# Patient Record
Sex: MALE | Race: BLACK OR AFRICAN AMERICAN | Employment: UNEMPLOYED | URBAN - METROPOLITAN AREA
[De-identification: names, ages, dates, MRNs, and addresses within clinical notes are randomized per-mention and may not be internally consistent; named-entity substitution may affect disease eponyms.]

---

## 2021-06-15 ENCOUNTER — HOSPITAL ENCOUNTER (EMERGENCY)
Age: 31
Discharge: HOME | End: 2021-06-16
Attending: EMERGENCY MEDICINE
Payer: MEDICAID

## 2021-06-15 VITALS
HEIGHT: 63 IN | HEART RATE: 98 BPM | RESPIRATION RATE: 20 BRPM | SYSTOLIC BLOOD PRESSURE: 146 MMHG | OXYGEN SATURATION: 97 % | TEMPERATURE: 98.1 F | WEIGHT: 135 LBS | BODY MASS INDEX: 23.92 KG/M2 | DIASTOLIC BLOOD PRESSURE: 79 MMHG

## 2021-06-15 DIAGNOSIS — Z72.0 TOBACCO ABUSE: ICD-10-CM

## 2021-06-15 DIAGNOSIS — J34.89 NASAL PAIN: ICD-10-CM

## 2021-06-15 DIAGNOSIS — S02.2XXA CLOSED FRACTURE OF NASAL BONE, INITIAL ENCOUNTER: Primary | ICD-10-CM
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
## 2021-06-15 PROCEDURE — 99284 EMERGENCY DEPT VISIT MOD MDM: CPT

## 2021-06-15 NOTE — LETTER
[facility] EMERGENCY DEPT 
[address] [address] 
[phone] Work/School Note Date: 6/15/2021 To Whom It May concern: 
 
Froy Ramirez was seen and treated today in the emergency room by the following provider(s): 
Attending Provider: Job Martinez MD.   
 
Froy Ramirez may return to work on 6/18/21. Sincerely, Duy Tavarez MD

## 2021-06-16 ENCOUNTER — APPOINTMENT (OUTPATIENT)
Dept: GENERAL RADIOLOGY | Age: 31
End: 2021-06-16
Attending: EMERGENCY MEDICINE
Payer: MEDICAID

## 2021-06-16 PROCEDURE — 74011250637 HC RX REV CODE- 250/637: Performed by: EMERGENCY MEDICINE

## 2021-06-16 PROCEDURE — 70160 X-RAY EXAM OF NASAL BONES: CPT

## 2021-06-16 RX ORDER — OXYMETAZOLINE HCL 0.05 %
2 SPRAY, NON-AEROSOL (ML) NASAL 2 TIMES DAILY
Qty: 1 EACH | Refills: 0 | Status: SHIPPED | OUTPATIENT
Start: 2021-06-16 | End: 2021-06-16 | Stop reason: SDUPTHER

## 2021-06-16 RX ORDER — BUTALBITAL, ACETAMINOPHEN AND CAFFEINE 50; 325; 40 MG/1; MG/1; MG/1
1 TABLET ORAL
Status: COMPLETED | OUTPATIENT
Start: 2021-06-16 | End: 2021-06-16

## 2021-06-16 RX ORDER — ACETAMINOPHEN 325 MG/1
650 TABLET ORAL
Qty: 20 TABLET | Refills: 0 | Status: SHIPPED | OUTPATIENT
Start: 2021-06-16 | End: 2021-06-16 | Stop reason: SDUPTHER

## 2021-06-16 RX ORDER — ACETAMINOPHEN 325 MG/1
650 TABLET ORAL
Qty: 20 TABLET | Refills: 0 | Status: SHIPPED | OUTPATIENT
Start: 2021-06-16

## 2021-06-16 RX ORDER — OXYMETAZOLINE HCL 0.05 %
2 SPRAY, NON-AEROSOL (ML) NASAL 2 TIMES DAILY
Qty: 1 EACH | Refills: 0 | Status: SHIPPED | OUTPATIENT
Start: 2021-06-16 | End: 2021-06-19

## 2021-06-16 RX ORDER — NAPROXEN 500 MG/1
500 TABLET ORAL 2 TIMES DAILY WITH MEALS
Qty: 20 TABLET | Refills: 0 | Status: SHIPPED | OUTPATIENT
Start: 2021-06-16 | End: 2021-06-16 | Stop reason: SDUPTHER

## 2021-06-16 RX ORDER — NAPROXEN 500 MG/1
500 TABLET ORAL 2 TIMES DAILY WITH MEALS
Qty: 20 TABLET | Refills: 0 | Status: SHIPPED | OUTPATIENT
Start: 2021-06-16

## 2021-06-16 RX ORDER — NAPROXEN 250 MG/1
500 TABLET ORAL ONCE
Status: COMPLETED | OUTPATIENT
Start: 2021-06-16 | End: 2021-06-16

## 2021-06-16 RX ADMIN — BUTALBITAL, ACETAMINOPHEN, AND CAFFEINE 1 TABLET: 50; 325; 40 TABLET ORAL at 00:53

## 2021-06-16 RX ADMIN — NAPROXEN 500 MG: 250 TABLET ORAL at 00:53

## 2021-06-16 NOTE — DISCHARGE INSTRUCTIONS
It was a pleasure taking care of you in our Emergency Department today. We know that when you come to [facility], you are entrusting us with your health, comfort, and safety. Our physicians and nurses honor that trust, and truly appreciate the opportunity to care for you and your loved ones. We also value your feedback. If you receive a survey about your Emergency Department experience today, please fill it out. We care about our patients' feedback, and we listen to what you have to say. Thank you!
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
## 2021-06-16 NOTE — ED NOTES
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
## 2021-07-06 ENCOUNTER — HOSPITAL ENCOUNTER (OUTPATIENT)
Dept: GENERAL RADIOLOGY | Age: 31
Discharge: HOME | End: 2021-07-06
Payer: MEDICAID

## 2021-07-06 ENCOUNTER — TRANSCRIBE ORDER (OUTPATIENT)
Dept: REGISTRATION | Age: 31
End: 2021-07-06

## 2021-07-06 ENCOUNTER — HOSPITAL ENCOUNTER (EMERGENCY)
Age: 31
Discharge: HOME | End: 2021-07-06
Attending: EMERGENCY MEDICINE

## 2021-07-06 DIAGNOSIS — M54.50 LUMBAR BACK PAIN: ICD-10-CM

## 2021-07-06 DIAGNOSIS — M54.50 LUMBAR BACK PAIN: Primary | ICD-10-CM

## 2021-07-06 DIAGNOSIS — M25.561 RIGHT KNEE PAIN: ICD-10-CM

## 2021-07-06 DIAGNOSIS — M25.562 LEFT KNEE PAIN: ICD-10-CM

## 2021-07-06 PROCEDURE — 73562 X-RAY EXAM OF KNEE 3: CPT

## 2021-07-06 PROCEDURE — 72100 X-RAY EXAM L-S SPINE 2/3 VWS: CPT

## 2021-07-28 ENCOUNTER — HOSPITAL ENCOUNTER (OUTPATIENT)
Dept: PHYSICAL THERAPY | Age: 31
Discharge: HOME | End: 2021-07-28
Payer: MEDICAID

## 2021-07-28 PROCEDURE — 97161 PT EVAL LOW COMPLEX 20 MIN: CPT

## 2021-07-28 PROCEDURE — 97110 THERAPEUTIC EXERCISES: CPT
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
## 2021-08-05 ENCOUNTER — HOSPITAL ENCOUNTER (OUTPATIENT)
Dept: PHYSICAL THERAPY | Age: 31
Discharge: HOME | End: 2021-08-05
Payer: MEDICAID

## 2021-08-05 PROCEDURE — 97140 MANUAL THERAPY 1/> REGIONS: CPT | Performed by: PHYSICAL THERAPIST

## 2021-08-05 PROCEDURE — 97110 THERAPEUTIC EXERCISES: CPT | Performed by: PHYSICAL THERAPIST
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
## 2021-08-12 ENCOUNTER — HOSPITAL ENCOUNTER (OUTPATIENT)
Dept: PHYSICAL THERAPY | Age: 31
Discharge: HOME | End: 2021-08-12
Payer: MEDICAID

## 2021-08-12 PROCEDURE — 97140 MANUAL THERAPY 1/> REGIONS: CPT | Performed by: PHYSICAL THERAPIST

## 2021-08-12 PROCEDURE — 97110 THERAPEUTIC EXERCISES: CPT | Performed by: PHYSICAL THERAPIST
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
## 2021-08-19 ENCOUNTER — HOSPITAL ENCOUNTER (OUTPATIENT)
Dept: PHYSICAL THERAPY | Age: 31
Discharge: HOME | End: 2021-08-19
Payer: MEDICAID

## 2021-08-19 PROCEDURE — 97110 THERAPEUTIC EXERCISES: CPT | Performed by: PHYSICAL THERAPIST

## 2021-08-19 PROCEDURE — 97140 MANUAL THERAPY 1/> REGIONS: CPT | Performed by: PHYSICAL THERAPIST
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
## 2021-08-26 ENCOUNTER — HOSPITAL ENCOUNTER (OUTPATIENT)
Dept: PHYSICAL THERAPY | Age: 31
Discharge: HOME | End: 2021-08-26
Payer: MEDICAID

## 2021-08-26 PROCEDURE — 97110 THERAPEUTIC EXERCISES: CPT | Performed by: PHYSICAL THERAPIST
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
## 2021-09-03 ENCOUNTER — HOSPITAL ENCOUNTER (OUTPATIENT)
Dept: PHYSICAL THERAPY | Age: 31
Discharge: HOME | End: 2021-09-03
Payer: MEDICAID

## 2021-09-03 PROCEDURE — 97530 THERAPEUTIC ACTIVITIES: CPT | Performed by: PHYSICAL THERAPIST

## 2021-09-03 NOTE — PROGRESS NOTES
[facility]  [address], [address] [facility]    OUTPATIENT PHYSICAL THERAPY DAILY TREATMENT AND DISCHARGE NOTE  VISIT: 6    NAME: Roxi Seo AGE: 28 y.o. GENDER: male  DATE: 9/3/2021  REFERRING PHYSICIAN: Abel Sanches*    GOALS  Short term goals  Time frame: 3 weeks  1. Patient will be compliant and independent with the initial HEP as evidenced by being able to perform without cuing. MET  2. Patient will report a 25% improvement in symptoms. Not Met  3. Patient report a 25% improvement in sleeping. MET  4. Patient will have an increased tolerance for sitting to allow 15 minutes of the activity before symptoms start. MET  5. Patient will tolerate 30 minutes of clinic activities before onset of symptoms. Not Met     Long term goals  Time frame: 6 weeks  1. Patient will report pain level decrease to 2/10 to allow increased ease of movement. Not Met  2. Patient will have an improved score on the Oswestry LBPQ outcome measure by 10 points to demonstrate an increase in functional activity tolerance. MET  3. Patient will be independent in final individualized HEP. MET  4. Patient will have an increase in pain free hip flexion strength to 5/5 to demonstrate improved core strength and activity tolerance. MET  5. Patient will return to walking > 1 block without being limited by symptoms. MET  6. Patient will sleep 6-8 hours without being interrupted by pain. Not Met     SUBJECTIVE:   \"I am about the same. I go back to my doctor on the 9th.  I am 6-7% better\"    Pain In: 5/10 tight in R knee and lumbar region    OBJECTIVE DATA SUMMARY:   Updated 9/3/21  EXAMINATION/PRESENTATION/DECISION MAKING:   Pain:  Location: primarily bilateral lumbar spine, also right knee  Quality: tightness, sharp  Now: 4-5/10  Best: 4/10  Worst: 10/10  Factors that improve pain: nothing found     Posture:   WFL     Strength:   Grossly 5/5     Range of Motion:   Right knee 0-135  Left knee 0-140     Spinal Assessment:   Lumbar Spine (AROM)  (*Measured 3rd finger from the floor)  Flexion*           Fingers to floor  Extension        WNL  Bilateral side bend equal but tendency towards subsitution when attempted to measure  R rotation        75% available  L rotation         75% available     Joint Mobility:   Right patellar glides WNL     Palpation:   TTP L2-5 Spinal processes  TTP bilateral lumbar paraspinals  No overt tenderness right knee joint line, eliud patellar, or patellar tendon    Neurologic Assessment:               Tone: normal               Sensation: normal                 Special Tests:   Neg right anterior drawer  Neg SLR     Functional Measure:   Modified Oswestry Low Back Pain Disability Questionnaire: 90%  9/3/21 KYRA: 44%     TREATMENT/INTERVENTION:  --Interventions in BOLD performed today--    Modalities (Rationale): None today     Therapeutic Exercises: to develop strength, endurance, range of motion, and flexibility  Supine:  LTR 10x each way  SAQ 2x 10  SLR 2x 10   Clamshell GTB 2x 10  Bridge with GTB 2x 10    Seated:  Lumbar rotation 5x each way  Theraball forward trunk flexion 5x each way   QL sidebending stretch to L only 5x  Recumbent bike 5min L3    Quadruped:  Bird dog LEs only 10x  Child's pose 10x in flexion 3x with R and L bias    Standing:  Ab pulldown 25# 2x 10     Manual Therapy: for joint mobilization/manipulations and soft tissue mobilization  STM to BL lumbar paraspinal mm and QL mm  Lumbar rotational gapping MET and Gr V rotational mobilization at L3-4 R and L    PFJ mobs S/I with and without AROM Gr II-III for decreased pain and improved mobility (held to pain)     Neuro Re-Education: to improve movement, balance, coordination, kinesthetic sense, posture, and proprioception for sitting or standing balance  None this date    Therapeutic Activity: to improve functional performance, power, or agility  Postural modification for walking/standing  HEP demonstration    Activity tolerance and post treatment pain report:   Fair, requires constant cues for form and relaxation    Pain Out: \"same, my knees are tight\"    Education:  Education was provided to the patient on the following topics: Anatomy and pathophysiology of injury. Graded exposure/return to activity. []    No changes were made to the home exercise program.  [x]    The following changes were made to the home exercise program:      Access Code: [email or web address]  URL: [email or web address]  Date: 09/03/2021  Prepared by: Roselle Nyhan    Exercises  Supine Lower Trunk Rotation - 2-3 x daily - 7 x weekly - 2 sets - 10 reps  Seated Lumbar Flexion Stretch - 2-3 x daily - 7 x weekly - 2 sets - 10 reps  Child's Pose Stretch - 2-3 x daily - 7 x weekly - 10 reps  Supine Knee Extension Strengthening - 2 x daily - 7 x weekly - 2 sets - 10 reps  Supine Active Straight Leg Raise - 2 x daily - 7 x weekly - 2 sets - 10 reps    Patient verbalized understanding of the topics presented. ASSESSMENT:   Patient continues to show full AROM in lumbar region and R knee. Despite improvements in functional measures he continues to report high levels of pain and subjective minimal improvement in QoL. Due to limited perceived progress with PT intervention patient was referred back to PCP to determine need for further diagnostic testing and medical management of current symptoms. Patients progression toward goals is as follows:  []     Improving appropriately and progressing toward goals  []     Improving slowly and progressing toward goals  [x]     Not making progress toward goals and plan of care will be adjusted    PLAN OF CARE:   Patient will be discharged from physical therapy at this time.   Criteria for termination of care:  []   Goals Achieved  [x]   Plateau Reached  []   Patient has not returned  []   Other:   Plan for follow up, continuing care, or equipment recommendations: Return to PCP for further diagnostic testing and medical management.  Patient not responding to PT intervention  Thank you for this referral.    Mackenzie Lopez, PT   Time Calculation: 15 mins  Patient Time in clinic:   Start Time: 2840   Stop Time: [phone]

## 2021-10-22 ENCOUNTER — TRANSCRIBE ORDER (OUTPATIENT)
Dept: SCHEDULING | Age: 31
End: 2021-10-22

## 2021-10-22 DIAGNOSIS — M54.50 LUMBAR PAIN: Primary | ICD-10-CM

## 2021-10-22 DIAGNOSIS — M25.562 ACUTE PAIN OF LEFT KNEE: Primary | ICD-10-CM

## 2021-11-01 ENCOUNTER — HOSPITAL ENCOUNTER (OUTPATIENT)
Dept: MRI IMAGING | Age: 31
Discharge: HOME | End: 2021-11-01
Attending: FAMILY MEDICINE
Payer: MEDICAID

## 2021-11-01 DIAGNOSIS — M25.562 ACUTE PAIN OF LEFT KNEE: ICD-10-CM

## 2021-11-01 DIAGNOSIS — M54.50 LUMBAR PAIN: ICD-10-CM

## 2021-11-01 PROCEDURE — 72148 MRI LUMBAR SPINE W/O DYE: CPT

## 2021-11-01 PROCEDURE — 73721 MRI JNT OF LWR EXTRE W/O DYE: CPT

## 2021-11-05 ENCOUNTER — TRANSCRIBE ORDER (OUTPATIENT)
Dept: SCHEDULING | Age: 31
End: 2021-11-05

## 2021-11-05 DIAGNOSIS — M25.561 ACUTE PAIN OF RIGHT KNEE: Primary | ICD-10-CM

## 2021-11-17 ENCOUNTER — HOSPITAL ENCOUNTER (OUTPATIENT)
Dept: MRI IMAGING | Age: 31
Discharge: HOME | End: 2021-11-17
Attending: FAMILY MEDICINE
Payer: MEDICAID

## 2021-11-17 DIAGNOSIS — M25.561 ACUTE PAIN OF RIGHT KNEE: ICD-10-CM

## 2021-11-17 PROCEDURE — 73721 MRI JNT OF LWR EXTRE W/O DYE: CPT
